# Patient Record
Sex: MALE | Race: WHITE | NOT HISPANIC OR LATINO | URBAN - NONMETROPOLITAN AREA
[De-identification: names, ages, dates, MRNs, and addresses within clinical notes are randomized per-mention and may not be internally consistent; named-entity substitution may affect disease eponyms.]

---

## 2023-02-12 ENCOUNTER — OFFICE VISIT (OUTPATIENT)
Dept: URGENT CARE | Facility: CLINIC | Age: 23
End: 2023-02-12

## 2023-02-12 VITALS
RESPIRATION RATE: 18 BRPM | HEIGHT: 71 IN | TEMPERATURE: 98.4 F | WEIGHT: 160 LBS | BODY MASS INDEX: 22.4 KG/M2 | OXYGEN SATURATION: 98 % | DIASTOLIC BLOOD PRESSURE: 82 MMHG | HEART RATE: 80 BPM | SYSTOLIC BLOOD PRESSURE: 118 MMHG

## 2023-02-12 DIAGNOSIS — Z48.02 ENCOUNTER FOR REMOVAL OF SUTURES: ICD-10-CM

## 2023-02-12 PROBLEM — Z77.22 SECOND HAND TOBACCO SMOKE EXPOSURE: Status: ACTIVE | Noted: 2019-11-21

## 2023-02-12 PROBLEM — G89.29 CHRONIC MIDLINE LOW BACK PAIN WITHOUT SCIATICA: Status: ACTIVE | Noted: 2019-11-06

## 2023-02-12 PROBLEM — M54.50 CHRONIC MIDLINE LOW BACK PAIN WITHOUT SCIATICA: Status: ACTIVE | Noted: 2019-11-06

## 2023-02-12 PROCEDURE — 99202 OFFICE O/P NEW SF 15 MIN: CPT | Performed by: NURSE PRACTITIONER

## 2023-02-12 RX ORDER — CEPHALEXIN 500 MG/1
CAPSULE ORAL
COMMUNITY
Start: 2023-02-07

## 2023-02-12 ASSESSMENT — ENCOUNTER SYMPTOMS: FEVER: 0

## 2023-02-12 NOTE — PROGRESS NOTES
"  Subjective:     Michel Patel is a 22 y.o. male who presents for Suture / Staple Removal (3 sutures on shin, placed 02/04/2023)      2/4, 3 sutures placed in the ED. Numbness in the area. Stating he's slowing getting back sensation.           Suture / Staple Removal  The treatment provided significant relief. His temperature was unmeasured prior to arrival. There has been no drainage from the wound. There is no redness present. He has no difficulty moving the affected extremity or digit.     History reviewed. No pertinent past medical history.    History reviewed. No pertinent surgical history.    Social History     Socioeconomic History    Marital status: Single     Spouse name: Not on file    Number of children: Not on file    Years of education: Not on file    Highest education level: Not on file   Occupational History    Not on file   Tobacco Use    Smoking status: Never    Smokeless tobacco: Never   Substance and Sexual Activity    Alcohol use: Not on file    Drug use: Not on file    Sexual activity: Not on file   Other Topics Concern    Not on file   Social History Narrative    Not on file     Social Determinants of Health     Financial Resource Strain: Not on file   Food Insecurity: Not on file   Transportation Needs: Not on file   Physical Activity: Not on file   Stress: Not on file   Social Connections: Not on file   Intimate Partner Violence: Not on file   Housing Stability: Not on file        History reviewed. No pertinent family history.     Not on File    Review of Systems   Constitutional:  Negative for fever.   All other systems reviewed and are negative.     Objective:   /82   Pulse 80   Temp 36.9 °C (98.4 °F) (Temporal)   Resp 18   Ht 1.803 m (5' 11\")   Wt 72.6 kg (160 lb)   SpO2 98%   BMI 22.32 kg/m²     Physical Exam  Vitals reviewed.   Pulmonary:      Effort: Pulmonary effort is normal. No respiratory distress.   Musculoskeletal:         General: Tenderness present. No deformity. "   Skin:     General: Skin is warm and dry.      Findings: No erythema.      Comments: Left shin, 3 intact sutures, scabbed wound, edges approximated. No erythema or drainage.    Neurological:      Mental Status: He is alert and oriented to person, place, and time.   Psychiatric:         Behavior: Behavior normal.       Assessment/Plan:   1. Encounter for removal of sutures    Other orders  - cephALEXin (KEFLEX) 500 MG Cap; TAKE 1 CAPSULE BY MOUTH 4 TIMES DAILY FOR 7 DAYS    -NSAID's (ibuprofen) and tylenol as directed for pain and inflammation.   -Gently clean area with mild soap and water.     Follow up for signs of infection (redness, red streaking, pus, foul odor, severe pain, increased swelling or fever) or any other concerns.     Procedure: Suture Removal  -Removed 3 sutures with suture removal kit  -Dressed with polysporin.  -Patient tolerated well    Differential diagnosis, natural history, supportive care, and indications for immediate follow-up discussed.

## 2023-02-12 NOTE — PATIENT INSTRUCTIONS
-NSAID's (ibuprofen) and tylenol as directed for pain and inflammation.   -Gently clean area with mild soap and water.     Follow up for signs of infection (redness, red streaking, pus, foul odor, severe pain, increased swelling or fever) or any other concerns.